# Patient Record
Sex: FEMALE | Race: BLACK OR AFRICAN AMERICAN | NOT HISPANIC OR LATINO | URBAN - METROPOLITAN AREA
[De-identification: names, ages, dates, MRNs, and addresses within clinical notes are randomized per-mention and may not be internally consistent; named-entity substitution may affect disease eponyms.]

---

## 2017-06-21 ENCOUNTER — NEW PATIENT (OUTPATIENT)
Dept: URBAN - METROPOLITAN AREA CLINIC 60 | Facility: CLINIC | Age: 62
End: 2017-06-21
Payer: OTHER GOVERNMENT

## 2017-06-21 DIAGNOSIS — Z96.1 PRESENCE OF INTRAOCULAR LENS: ICD-10-CM

## 2017-06-21 DIAGNOSIS — H52.223 REGULAR ASTIGMATISM, BILATERAL: ICD-10-CM

## 2017-06-21 DIAGNOSIS — H43.813 VITREOUS DEGENERATION, BILATERAL: ICD-10-CM

## 2017-06-21 DIAGNOSIS — H18.51 FUCHS' DYSTROPHY: ICD-10-CM

## 2017-06-21 PROCEDURE — 92004 COMPRE OPH EXAM NEW PT 1/>: CPT | Performed by: OPTOMETRIST

## 2017-06-21 PROCEDURE — 92015 DETERMINE REFRACTIVE STATE: CPT | Performed by: OPTOMETRIST

## 2017-06-21 ASSESSMENT — INTRAOCULAR PRESSURE
OS: 16
OD: 15

## 2017-06-21 ASSESSMENT — VISUAL ACUITY
OD: 20/30
OS: 20/20

## 2017-07-14 ENCOUNTER — FOLLOW UP ESTABLISHED (OUTPATIENT)
Dept: URBAN - METROPOLITAN AREA CLINIC 60 | Facility: CLINIC | Age: 62
End: 2017-07-14
Payer: OTHER GOVERNMENT

## 2017-07-14 DIAGNOSIS — H40.013 OPEN ANGLE WITH BORDERLINE FINDINGS, LOW RISK, BILATERAL: Primary | ICD-10-CM

## 2017-07-14 PROCEDURE — 92133 CPTRZD OPH DX IMG PST SGM ON: CPT | Performed by: OPTOMETRIST

## 2017-07-14 PROCEDURE — 92012 INTRM OPH EXAM EST PATIENT: CPT | Performed by: OPTOMETRIST

## 2017-07-14 PROCEDURE — 92083 EXTENDED VISUAL FIELD XM: CPT | Performed by: OPTOMETRIST

## 2017-07-14 PROCEDURE — 76514 ECHO EXAM OF EYE THICKNESS: CPT | Performed by: OPTOMETRIST

## 2017-07-14 ASSESSMENT — INTRAOCULAR PRESSURE
OD: 15
OS: 15

## 2022-02-10 ENCOUNTER — OFFICE VISIT (OUTPATIENT)
Dept: URBAN - METROPOLITAN AREA CLINIC 59 | Facility: CLINIC | Age: 67
End: 2022-02-10
Payer: COMMERCIAL

## 2022-02-10 DIAGNOSIS — H18.513 FUCHS' DYSTROPHY: ICD-10-CM

## 2022-02-10 DIAGNOSIS — H52.4 PRESBYOPIA: Primary | ICD-10-CM

## 2022-02-10 PROCEDURE — 92004 COMPRE OPH EXAM NEW PT 1/>: CPT | Performed by: OPTOMETRIST

## 2022-02-10 ASSESSMENT — INTRAOCULAR PRESSURE
OD: 18
OS: 18

## 2022-02-10 ASSESSMENT — VISUAL ACUITY
OD: 20/40
OS: 20/25

## 2022-02-10 NOTE — IMPRESSION/PLAN
Impression: Vitreous degeneration, bilateral Plan: PVD accounts for patient's complaint. Symptoms of retinal detachment or tears were discussed in detail. If patient notices any symptoms discussed, contact office ASAP.

## 2022-02-10 NOTE — IMPRESSION/PLAN
Impression: Open angle with borderline findings, low risk, bilateral
*thick packs, no known family history * Plan: IOP slightly higher OU. Last visit 15/15 today 18/18. Have not seen patient since 2017. Patient to RTC under medical plan in 2-3  months for Vf 24/2, RNFL and IOP check.

## 2022-05-16 ENCOUNTER — OFFICE VISIT (OUTPATIENT)
Dept: URBAN - METROPOLITAN AREA CLINIC 59 | Facility: CLINIC | Age: 67
End: 2022-05-16
Payer: COMMERCIAL

## 2022-05-16 DIAGNOSIS — H40.013 OPEN ANGLE WITH BORDERLINE FINDINGS, LOW RISK, BILATERAL: Primary | ICD-10-CM

## 2022-05-16 PROCEDURE — 92083 EXTENDED VISUAL FIELD XM: CPT | Performed by: OPTOMETRIST

## 2022-05-16 PROCEDURE — 92133 CPTRZD OPH DX IMG PST SGM ON: CPT | Performed by: OPTOMETRIST

## 2022-05-16 PROCEDURE — 99213 OFFICE O/P EST LOW 20 MIN: CPT | Performed by: OPTOMETRIST

## 2022-05-16 ASSESSMENT — INTRAOCULAR PRESSURE
OD: 17
OS: 17

## 2022-05-16 NOTE — IMPRESSION/PLAN
Impression: Open angle with borderline findings, low risk, bilateral
*thick packs, no known family history * Plan: IOP stable OU. RNFL and VF done today. Patient educated on findings. Will continue to monitor patient without treatment. RTC in February for CE, VF 10/2 and MAC OCT. RNFL 
OD: ave t 82 borderline superior OS: ave t 79 borderline superior VF: normal OU

## 2023-02-13 ENCOUNTER — OFFICE VISIT (OUTPATIENT)
Dept: URBAN - METROPOLITAN AREA CLINIC 59 | Facility: CLINIC | Age: 68
End: 2023-02-13
Payer: COMMERCIAL

## 2023-02-13 DIAGNOSIS — H18.513 FUCHS' DYSTROPHY: ICD-10-CM

## 2023-02-13 DIAGNOSIS — Z79.899 OTHER LONG TERM (CURRENT) DRUG THERAPY: ICD-10-CM

## 2023-02-13 DIAGNOSIS — H43.813 VITREOUS DEGENERATION, BILATERAL: ICD-10-CM

## 2023-02-13 DIAGNOSIS — M06.9 RHEUMATOID ARTHRITIS, UNSPECIFIED: ICD-10-CM

## 2023-02-13 DIAGNOSIS — H40.013 OPEN ANGLE WITH BORDERLINE FINDINGS, LOW RISK, BILATERAL: Primary | ICD-10-CM

## 2023-02-13 PROCEDURE — 92134 CPTRZ OPH DX IMG PST SGM RTA: CPT | Performed by: OPTOMETRIST

## 2023-02-13 PROCEDURE — 92083 EXTENDED VISUAL FIELD XM: CPT | Performed by: OPTOMETRIST

## 2023-02-13 PROCEDURE — 99214 OFFICE O/P EST MOD 30 MIN: CPT | Performed by: OPTOMETRIST

## 2023-02-13 ASSESSMENT — INTRAOCULAR PRESSURE
OS: 18
OD: 19

## 2023-02-13 NOTE — IMPRESSION/PLAN
Impression: Open angle with borderline findings, low risk, bilateral
*thick packs, no known family history * Plan: Stable IOP. Patient educated on findings. Will continue to monitor patient without treatment. RTC in 6 months for IOP check, VF 24-2 and OCT RNFL.

## 2023-02-13 NOTE — IMPRESSION/PLAN
Impression: Rheumatoid arthritis, unspecified: M06.9. Plan: See plan #2. Continue care with Dr. Naida Medrano.

## 2023-02-13 NOTE — IMPRESSION/PLAN
Impression: Other long term (current) drug therapy: Z79.899. Plan: No Plaquenil Toxicity. OCT (MAC) and VF 10-2 performed today. Patient is permitted to continue Plaquenil. Patient to RTC in 1 year for dilated evaluation, OCT (MAC) and HVF 10-2. VF:  Normal OU
OCT (MAC): normal OU.

## 2023-08-21 ENCOUNTER — OFFICE VISIT (OUTPATIENT)
Dept: URBAN - METROPOLITAN AREA CLINIC 59 | Facility: CLINIC | Age: 68
End: 2023-08-21
Payer: COMMERCIAL

## 2023-08-21 DIAGNOSIS — H40.013 OPEN ANGLE WITH BORDERLINE FINDINGS, LOW RISK, BILATERAL: Primary | ICD-10-CM

## 2023-08-21 PROCEDURE — 92133 CPTRZD OPH DX IMG PST SGM ON: CPT | Performed by: OPTOMETRIST

## 2023-08-21 PROCEDURE — 92083 EXTENDED VISUAL FIELD XM: CPT | Performed by: OPTOMETRIST

## 2023-08-21 PROCEDURE — 99213 OFFICE O/P EST LOW 20 MIN: CPT | Performed by: OPTOMETRIST

## 2023-08-21 ASSESSMENT — INTRAOCULAR PRESSURE
OS: 18
OD: 18

## 2024-02-11 NOTE — IMPRESSION/PLAN
Impression: Presbyopia: H52.4. Plan: New glasses Rx was given today.  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

## 2024-03-01 ENCOUNTER — OFFICE VISIT (OUTPATIENT)
Dept: URBAN - METROPOLITAN AREA CLINIC 59 | Facility: CLINIC | Age: 69
End: 2024-03-01
Payer: COMMERCIAL

## 2024-03-01 DIAGNOSIS — H18.513 FUCHS' DYSTROPHY: ICD-10-CM

## 2024-03-01 DIAGNOSIS — H40.013 OPEN ANGLE WITH BORDERLINE FINDINGS, LOW RISK, BILATERAL: ICD-10-CM

## 2024-03-01 DIAGNOSIS — Z79.899 OTHER LONG TERM (CURRENT) DRUG THERAPY: ICD-10-CM

## 2024-03-01 DIAGNOSIS — M06.9 RHEUMATOID ARTHRITIS, UNSPECIFIED: Primary | ICD-10-CM

## 2024-03-01 PROCEDURE — 92083 EXTENDED VISUAL FIELD XM: CPT | Performed by: OPTOMETRIST

## 2024-03-01 PROCEDURE — 92134 CPTRZ OPH DX IMG PST SGM RTA: CPT | Performed by: OPTOMETRIST

## 2024-03-01 PROCEDURE — 92014 COMPRE OPH EXAM EST PT 1/>: CPT | Performed by: OPTOMETRIST

## 2024-03-01 ASSESSMENT — VISUAL ACUITY
OS: 20/20
OD: 20/40

## 2024-03-01 ASSESSMENT — INTRAOCULAR PRESSURE
OD: 18
OS: 18

## 2024-09-05 ENCOUNTER — OFFICE VISIT (OUTPATIENT)
Dept: URBAN - METROPOLITAN AREA CLINIC 59 | Facility: CLINIC | Age: 69
End: 2024-09-05
Payer: COMMERCIAL

## 2024-09-05 DIAGNOSIS — H40.013 OPEN ANGLE WITH BORDERLINE FINDINGS, LOW RISK, BILATERAL: Primary | ICD-10-CM

## 2024-09-05 PROCEDURE — 92083 EXTENDED VISUAL FIELD XM: CPT | Performed by: OPTOMETRIST

## 2024-09-05 PROCEDURE — 92133 CPTRZD OPH DX IMG PST SGM ON: CPT | Performed by: OPTOMETRIST

## 2024-09-05 PROCEDURE — 99213 OFFICE O/P EST LOW 20 MIN: CPT | Performed by: OPTOMETRIST

## 2024-09-05 ASSESSMENT — INTRAOCULAR PRESSURE
OS: 14
OD: 14

## 2025-02-24 ENCOUNTER — OFFICE VISIT (OUTPATIENT)
Dept: URBAN - METROPOLITAN AREA CLINIC 59 | Facility: CLINIC | Age: 70
End: 2025-02-24
Payer: COMMERCIAL

## 2025-02-24 DIAGNOSIS — M06.9 RHEUMATOID ARTHRITIS, UNSPECIFIED: ICD-10-CM

## 2025-02-24 DIAGNOSIS — H40.013 OPEN ANGLE WITH BORDERLINE FINDINGS, LOW RISK, BILATERAL: Primary | ICD-10-CM

## 2025-02-24 DIAGNOSIS — H52.4 PRESBYOPIA: ICD-10-CM

## 2025-02-24 DIAGNOSIS — H43.813 VITREOUS DEGENERATION, BILATERAL: ICD-10-CM

## 2025-02-24 DIAGNOSIS — Z79.899 OTHER LONG TERM (CURRENT) DRUG THERAPY: ICD-10-CM

## 2025-02-24 DIAGNOSIS — Z96.1 PRESENCE OF INTRAOCULAR LENS: ICD-10-CM

## 2025-02-24 DIAGNOSIS — H18.513 FUCHS' DYSTROPHY: ICD-10-CM

## 2025-02-24 PROCEDURE — 92134 CPTRZ OPH DX IMG PST SGM RTA: CPT | Performed by: OPTOMETRIST

## 2025-02-24 PROCEDURE — 92014 COMPRE OPH EXAM EST PT 1/>: CPT | Performed by: OPTOMETRIST

## 2025-02-24 PROCEDURE — 92083 EXTENDED VISUAL FIELD XM: CPT | Performed by: OPTOMETRIST

## 2025-02-24 RX ORDER — METHOTREXATE SODIUM 2.5 MG/1
2.5 MG TABLET ORAL
Qty: 0 | Refills: 0 | Status: ACTIVE
Start: 2025-02-24

## 2025-02-24 ASSESSMENT — INTRAOCULAR PRESSURE
OS: 15
OD: 14

## 2025-02-24 ASSESSMENT — VISUAL ACUITY
OD: 20/40
OS: 20/20

## 2025-08-29 ENCOUNTER — OFFICE VISIT (OUTPATIENT)
Dept: URBAN - METROPOLITAN AREA CLINIC 59 | Facility: CLINIC | Age: 70
End: 2025-08-29
Payer: COMMERCIAL

## 2025-08-29 DIAGNOSIS — H40.013 OPEN ANGLE WITH BORDERLINE FINDINGS, LOW RISK, BILATERAL: Primary | ICD-10-CM

## 2025-08-29 PROCEDURE — 92133 CPTRZD OPH DX IMG PST SGM ON: CPT | Performed by: OPTOMETRIST

## 2025-08-29 PROCEDURE — 99213 OFFICE O/P EST LOW 20 MIN: CPT | Performed by: OPTOMETRIST

## 2025-08-29 PROCEDURE — 92083 EXTENDED VISUAL FIELD XM: CPT | Performed by: OPTOMETRIST

## 2025-08-29 ASSESSMENT — INTRAOCULAR PRESSURE
OD: 13
OS: 14